# Patient Record
Sex: FEMALE | Race: WHITE | Employment: FULL TIME | URBAN - METROPOLITAN AREA
[De-identification: names, ages, dates, MRNs, and addresses within clinical notes are randomized per-mention and may not be internally consistent; named-entity substitution may affect disease eponyms.]

---

## 2017-01-04 ENCOUNTER — TELEPHONE (OUTPATIENT)
Dept: INTERNAL MEDICINE CLINIC | Age: 42
End: 2017-01-04

## 2017-01-04 RX ORDER — VARENICLINE TARTRATE 1 MG/1
TABLET, FILM COATED ORAL
Refills: 0 | Status: CANCELLED | OUTPATIENT
Start: 2017-01-04 | End: 2017-04-04

## 2017-01-04 NOTE — TELEPHONE ENCOUNTER
----- Message from Kameron Crawford LPN sent at 1/0/3188  9:06 AM EST -----  Regarding: FW: Prescription Question  Contact: [phone]      ----- Message -----     From: Juan Jose Pineda     Sent: 1/3/2017   5:47 PM       To: [facility]  Subject: RE: Prescription Question                        Yes, I got the starter pack. Needed the following months.  ----- Message -----  From: Kameron Crawford LPN  Sent: 8/2/7262  3:27 PM EST  To: Juan Jose Pineda  Subject: RE: Prescription Question    Good afternoon,    I hope this e-mail finds you well. I attempted to contact you via phone but ws unsuccessful. Per Dr Merrilyn Goodell notes: I already gave her this rx in September. Why is she needing the chantix again? Your good health is important to us. As always, our goal is to be your partner in life-long wellness. Meagan Parker LPN      ----- Message -----     From: Juan Jose Pineda     Sent: 1/3/2017  1:39 PM EST       To: Sonal Forrester MD  Subject: Prescription Question    I have put in several requests for a refill of the Chantix and have not gotten a response. Is it possible to get a refill please.

## 2017-01-06 ENCOUNTER — TELEPHONE (OUTPATIENT)
Dept: INTERNAL MEDICINE CLINIC | Age: 42
End: 2017-01-06

## 2017-01-06 ENCOUNTER — PATIENT MESSAGE (OUTPATIENT)
Dept: INTERNAL MEDICINE CLINIC | Age: 42
End: 2017-01-06

## 2017-01-06 DIAGNOSIS — Z72.0 TOBACCO ABUSE: ICD-10-CM

## 2017-01-06 DIAGNOSIS — Z72.0 TOBACCO ABUSE: Primary | ICD-10-CM

## 2017-01-06 RX ORDER — VARENICLINE TARTRATE 1 MG/1
TABLET, FILM COATED ORAL
Qty: 42 TAB | Refills: 1 | Status: SHIPPED | OUTPATIENT
Start: 2017-01-06 | End: 2017-03-28 | Stop reason: SDUPTHER

## 2017-01-06 RX ORDER — VARENICLINE TARTRATE 1 MG/1
TABLET, FILM COATED ORAL
Qty: 42 TAB | Refills: 1 | Status: SHIPPED | OUTPATIENT
Start: 2017-01-06 | End: 2017-01-06 | Stop reason: SDUPTHER

## 2017-01-06 RX ORDER — VARENICLINE TARTRATE 1 MG/1
TABLET, FILM COATED ORAL
Qty: 42 TAB | Refills: 1 | Status: SHIPPED | OUTPATIENT
Start: 2017-01-06 | End: 2017-01-06 | Stop reason: CLARIF

## 2017-01-06 NOTE — TELEPHONE ENCOUNTER
Sent electronically:    Requested Prescriptions     Signed Prescriptions Disp Refills    varenicline (CHANTIX CONTINUING MONTH PAK) 1 mg tablet 42 Tab 1     Sig: Take one po bid x 11 weeks     Authorizing Provider: Paul Mercado

## 2017-01-06 NOTE — TELEPHONE ENCOUNTER
----- Message from Millie Green LPN sent at 8/3/1044  9:06 AM EST -----  Regarding: FW: Prescription Question  Contact: [phone]      ----- Message -----     From: Winifred Figueroa     Sent: 1/3/2017   5:47 PM       To: [facility]  Subject: RE: Prescription Question                        Yes, I got the starter pack. Needed the following months.  ----- Message -----  From: Millie Green LPN  Sent: 3/7/4850  3:27 PM EST  To: Winifred Figueroa  Subject: RE: Prescription Question    Good afternoon,    I hope this e-mail finds you well. I attempted to contact you via phone but ws unsuccessful. Per Dr Bernie Guerra notes: I already gave her this rx in September. Why is she needing the chantix again? Your good health is important to us. As always, our goal is to be your partner in life-long wellness. Jose Plunkett LPN      ----- Message -----     From: Winifred Figueroa     Sent: 1/3/2017  1:39 PM EST       To: Dick Krishnamurthy MD  Subject: Prescription Question    I have put in several requests for a refill of the Chantix and have not gotten a response. Is it possible to get a refill please.

## 2017-01-06 NOTE — TELEPHONE ENCOUNTER
Incoming call from pharm. 2 pt identifiers confirmed. Spoke with Kiara Mcdonough. Kiara Mcdonough reports for Chantix specific directions are required, will no longer allow medication to be dispensed as \"use as directed\".      Dr Vlad Alejandro please advise

## 2017-01-06 NOTE — TELEPHONE ENCOUNTER
Sent electronically:  Requested Prescriptions     Signed Prescriptions Disp Refills    varenicline (CHANTIX CONTINUING MONTH PAK) 1 mg tablet 42 Tab 1     Sig: Use as directed     Authorizing Provider: Dayday Peñaloza

## 2017-03-28 DIAGNOSIS — F41.9 ANXIETY: ICD-10-CM

## 2017-03-28 DIAGNOSIS — Z72.0 TOBACCO ABUSE: ICD-10-CM

## 2017-03-28 DIAGNOSIS — Z76.0 MEDICATION REFILL: ICD-10-CM

## 2017-03-28 DIAGNOSIS — F41.0 PANIC DISORDER: ICD-10-CM

## 2017-03-29 RX ORDER — VARENICLINE TARTRATE 1 MG/1
TABLET, FILM COATED ORAL
Qty: 42 TAB | Refills: 1 | Status: SHIPPED | OUTPATIENT
Start: 2017-03-29 | End: 2017-06-26

## 2017-03-29 RX ORDER — ALPRAZOLAM 0.5 MG/1
TABLET ORAL
Qty: 30 TAB | Refills: 0 | OUTPATIENT
Start: 2017-03-29

## 2017-03-29 NOTE — TELEPHONE ENCOUNTER
From: Nabeel Cavazos  To: Ita Amin MD  Sent: 3/28/2017 4:53 PM EDT  Subject: Medication Renewal Request    Original authorizing provider: MD Nabeel Dobbins would like a refill of the following medications:  ALPRAZolam (XANAX) 0.5 mg tablet [Nica Betancourt MD]  varenicline (CHANTIX CONTINUING MONTH ANTWAN) 1 mg tablet [Nica Jackson MD]    Preferred pharmacy: [facility] [phone]    Comment:

## 2017-03-29 NOTE — TELEPHONE ENCOUNTER
Called pt informed of need for appt she stated understanding and that she will call back to schedule an appt stated understanding no other questions or concerns noted at this time.

## 2017-03-29 NOTE — TELEPHONE ENCOUNTER
Denied xanax. Last seen 9/2016 and needs an OV with me. Sent electronically chantix.     Requested Prescriptions     Signed Prescriptions Disp Refills    varenicline (CHANTIX CONTINUING MONTH PAK) 1 mg tablet 42 Tab 1     Sig: Take one po bid x 11 weeks     Authorizing Provider: Jacques IBRAHIM     Refused Prescriptions Disp Refills    ALPRAZolam (XANAX) 0.5 mg tablet 30 Tab 0     Sig: Take one po qhs prn anxiety     Refused By: Alisa Mesa     Reason for Refusal: Appt required, please call patient

## 2018-06-20 ENCOUNTER — HOSPITAL ENCOUNTER (OUTPATIENT)
Dept: LAB | Age: 43
Discharge: HOME | End: 2018-06-20

## 2018-06-20 LAB — SENTARA SPECIMEN COL,SENBCF: NORMAL

## 2018-06-20 PROCEDURE — 99001 SPECIMEN HANDLING PT-LAB: CPT | Performed by: PHYSICIAN ASSISTANT
